# Patient Record
Sex: MALE
[De-identification: names, ages, dates, MRNs, and addresses within clinical notes are randomized per-mention and may not be internally consistent; named-entity substitution may affect disease eponyms.]

---

## 2018-11-20 ENCOUNTER — HOSPITAL ENCOUNTER (INPATIENT)
Dept: HOSPITAL 14 - H.ER | Age: 18
LOS: 7 days | Discharge: HOME | DRG: 885 | End: 2018-11-27
Attending: PSYCHIATRY & NEUROLOGY | Admitting: PSYCHIATRY & NEUROLOGY
Payer: COMMERCIAL

## 2018-11-20 VITALS — OXYGEN SATURATION: 99 %

## 2018-11-20 DIAGNOSIS — R45.851: ICD-10-CM

## 2018-11-20 DIAGNOSIS — F33.2: Primary | ICD-10-CM

## 2018-11-20 LAB
ALBUMIN SERPL-MCNC: 4.8 G/DL (ref 3.5–5)
ALBUMIN/GLOB SERPL: 1.2 {RATIO} (ref 1–2.1)
ALT SERPL-CCNC: 45 U/L (ref 21–72)
AST SERPL-CCNC: 38 U/L (ref 17–59)
BASOPHILS # BLD AUTO: 0 K/UL (ref 0–0.2)
BASOPHILS NFR BLD: 0.5 % (ref 0–2)
BILIRUB UR-MCNC: NEGATIVE MG/DL
BUN SERPL-MCNC: 14 MG/DL (ref 9–20)
CALCIUM SERPL-MCNC: 9.8 MG/DL (ref 8.4–10.2)
COLOR UR: YELLOW
EOSINOPHIL # BLD AUTO: 0.2 K/UL (ref 0–0.7)
EOSINOPHIL NFR BLD: 2.4 % (ref 0–4)
ERYTHROCYTE [DISTWIDTH] IN BLOOD BY AUTOMATED COUNT: 12.9 % (ref 11.5–14.5)
GFR NON-AFRICAN AMERICAN: > 60
GLUCOSE UR STRIP-MCNC: (no result) MG/DL
HGB BLD-MCNC: 14.6 G/DL (ref 12–18)
LEUKOCYTE ESTERASE UR-ACNC: (no result) LEU/UL
LYMPHOCYTES # BLD AUTO: 2.7 K/UL (ref 1–4.3)
LYMPHOCYTES NFR BLD AUTO: 26.4 % (ref 20–40)
MCH RBC QN AUTO: 28.7 PG (ref 27–31)
MCHC RBC AUTO-ENTMCNC: 33.6 G/DL (ref 33–37)
MCV RBC AUTO: 85.6 FL (ref 80–94)
MONOCYTES # BLD: 0.8 K/UL (ref 0–0.8)
MONOCYTES NFR BLD: 7.7 % (ref 0–10)
NEUTROPHILS # BLD: 6.4 K/UL (ref 1.8–7)
NEUTROPHILS NFR BLD AUTO: 63 % (ref 50–75)
NRBC BLD AUTO-RTO: 0 % (ref 0–0)
PH UR STRIP: 6 [PH] (ref 5–8)
PLATELET # BLD: 260 K/UL (ref 130–400)
PMV BLD AUTO: 8 FL (ref 7.2–11.7)
PROT UR STRIP-MCNC: NEGATIVE MG/DL
RBC # BLD AUTO: 5.07 MIL/UL (ref 4.4–5.9)
RBC # UR STRIP: NEGATIVE /UL
SP GR UR STRIP: 1.02 (ref 1–1.03)
SQUAMOUS EPITHIAL: < 1 /HPF (ref 0–5)
URINE CLARITY: (no result)
UROBILINOGEN UR-MCNC: (no result) MG/DL (ref 0.2–1)
WBC # BLD AUTO: 10.2 K/UL (ref 4.8–10.8)

## 2018-11-20 PROCEDURE — GZ58ZZZ INDIVIDUAL PSYCHOTHERAPY, COGNITIVE-BEHAVIORAL: ICD-10-PCS | Performed by: PSYCHIATRY & NEUROLOGY

## 2018-11-20 NOTE — ED PDOC
HPI: Psych/Substance Abuse


Time Seen by Provider: 11/20/18 16:36


Chief Complaint (Nursing): Psychiatric Evaluation


Chief Complaint (Provider): Psychiatric Evaluation


History Per: Patient


History/Exam Limitations: no limitations


Current Symptoms Are (Timing): Still Present


Additional Complaint(s): 





Tristan Rao is a 18 year old male with a past medical history of depression, 

who presents to the emergency department after being sent by his psychiatrist 

for possible inpatient treatment for depression given suicidal ideation. Patient

states he has been suffering from depression for several years and that it has 

worsened over the past month. He states that he takes Zoloft without any improv

ements. Patient has attempted to drown himself 1.5 weeks ago but he decided that

he "did not want to be a vegetable". Patient has stated to have found 

hydromorphone and was going to take it but ended up deciding against it. He 

currently admits to feeling suicidal but denies any homicidal ideation, auditory

or visual hallucination, or any physical complaints. Patient denies any drug or 

alcohol abuse. 





PMD: Dariana Oshea


Psychiatrist: Vinod Reveles 











Past Medical History


Reviewed: Historical Data, Nursing Documentation, Vital Signs


Vital Signs: 





                                Last Vital Signs











Temp  98.0 F   11/20/18 15:59


 


Pulse  82   11/20/18 15:59


 


Resp  18   11/20/18 15:59


 


BP  144/79   11/20/18 15:59


 


Pulse Ox  99   11/20/18 15:59














- Medical History


PMH: Depression





- Family History


Family History: States: Unknown Family Hx





- Allergies


Allergies/Adverse Reactions: 


                                    Allergies











Allergy/AdvReac Type Severity Reaction Status Date / Time


 


No Known Allergies Allergy   Verified 11/20/18 15:59














Review of Systems


ROS Statement: Except As Marked, All Systems Reviewed And Found Negative


Constitutional: Negative for: Fever, Chills, Sweats, Malaise, Weight loss


Cardiovascular: Negative for: Chest Pain, Palpitations


Respiratory: Negative for: Cough, Shortness of Breath


Psych: Positive for: Suicidal ideation.  Negative for: Other (homicidal 

ideation; auditory or visual hallucinations )





Physical Exam





- Reviewed


Nursing Documentation Reviewed: Yes


Vital Signs Reviewed: Yes





- Physical Exam


Cardiovascular/Chest: Positive for: Regular Rate, Rhythm.  Negative for: Murmur


Respiratory: Positive for: Normal Breath Sounds.  Negative for: Respiratory 

Distress


Neurologic/Psych: Positive for: Alert, Oriented





- Laboratory Results


Result Diagrams: 


                                 11/20/18 19:43





                                 11/20/18 19:43





- ECG


O2 Sat by Pulse Oximetry: 99 (RA)


Pulse Ox Interpretation: Normal





Medical Decision Making


Medical Decision Making: 





Initial Time: 17:18


Plan:


--Crisis evaluation


--CMP


--Urinalysis


--Chest X-ray 


--alcohol serum


--Urine Drug screen


--1:1 observation





Time: 17:33


Chest X-ray FINDINGS:


Examination limited by habitus. 





LUNGS:


No focal consolidation.  Innumerable scattered tiny probable calcified 

granulomas. 





Please note that chest x-ray has limited sensitivity for the detection of 

pulmonary masses.





PLEURA:


No significant pleural effusion identified. No definite pneumothorax .





CARDIOVASCULAR:


Heart size appears within normal limits.  No significant atherosclerotic 

calcification present. 





OSSEOUS STRUCTURES:


No acute osseous abnormality identified.





VISUALIZED UPPER ABDOMEN:


Unremarkable.





OTHER FINDINGS:


None.





IMPRESSION:


Innumerable scattered tiny probable calcified granulomas.





Time: 19:30


Chest x-ray shows likely calcified granuloma


Patient denies any history of TB, sarcoidosis, recent fevers, coughs, hemptosis 

or recent PPD or exposure to TB. 





Case d/w Hospitalist no need for further evaluation at this time given that 

patient is asymptomatic. Needs outpatient f/u. 





Pt is medically stable for psych admission. 





------------------------------------------------------------

-------------------------------------   


Scribe Attestation:


Documented by Nikolay Amado, acting as a scribe for Farideh Kent PA-C. 





Provider Scribe Attestation:


All medical record entries made by the Scribe were at my direction and 

personally dictated by me. I have reviewed the chart and agree that the record 

accurately reflects my personal performance of the history, physical exam, 

medical decision making, and the department course for this patient. I have also

personally directed, reviewed, and agree with the discharge instructions and 

disposition.





Disposition





- Clinical Impression


Clinical Impression: 


 Depression








- Patient ED Disposition


Is Patient to be Admitted: Yes


Discussed With : Jennifer Small


Doctor Will See Patient In The: Hospital


Counseled Patient/Family Regarding: Studies Performed, Diagnosis, Need For 

Followup





- Disposition


Disposition: Transfer of Care


Disposition Time: 22:45


Condition: STABLE





- POA


Present On Arrival: None

## 2018-11-20 NOTE — RAD
HISTORY:

 baseline 



COMPARISON:

No prior. 



TECHNIQUE:

Chest, one view.



FINDINGS:

Examination limited by habitus. 



LUNGS:

No focal consolidation.  Innumerable scattered tiny probable 

calcified granulomas. 



Please note that chest x-ray has limited sensitivity for the 

detection of pulmonary masses.



PLEURA:

No significant pleural effusion identified. No definite pneumothorax .



CARDIOVASCULAR:

Heart size appears within normal limits.  No significant 

atherosclerotic calcification present. 



OSSEOUS STRUCTURES:

No acute osseous abnormality identified.



VISUALIZED UPPER ABDOMEN:

Unremarkable.



OTHER FINDINGS:

None.



IMPRESSION:

Innumerable scattered tiny probable calcified granulomas.

## 2018-11-21 LAB
HDLC SERPL-MCNC: 34 MG/DL (ref 30–70)
LDLC SERPL-MCNC: 139 MG/DL (ref 0–129)
T4 SERPL-MCNC: 6.79 UG/DL (ref 5.5–11)

## 2018-11-21 NOTE — CARD
--------------- APPROVED REPORT --------------





Date of service: 11/20/2018



EKG Measurement

Heart [id number]

[id number]

[id number]

[id number]

[id number]



<Conclusion>

Normal sinus rhythm

Normal Electrocardiogram

## 2018-11-21 NOTE — PCM.PSYCH
Initial Psychiatric Evaluation





- Initial Psychiatric Evaluation


Type of Admission: Voluntary


Legal Status: Capacity


Chief Complaint (in patient's own words): 





I want to give up on my life


History of Present Illness and Precipitating Events: 





pt is 18ys old male with previous psychiatric diagnosis of depression referred 

to ER by private psychiatrist after expressing suicidal ideation with plan to 

drown himself.


pt has been in therapy since age 9 for depression due to bullying, has been 

seeing private psychiatrist for past four years, placed on zoloft 100 mg ,


pt has previous suicidal attempt two years ago trying to hang himself and stop

ped himself


reported for past month depression is worse as he has been unable to perform in 

school with possibility of loosing his scholarship and for recent death of his 

grand mother


reported poor motivation, low energy, lack of interest in any activity, 

continues to have passive suicidal thoughts without active plan on the unit, 


denied perceptual disturbances, denied substance use 





collateral information





Mother is tearful and believes pt requires inpatient admission.  Mother states 

that pt has several stressors of recent such as failing school which will result

into losing his scholarship.  Second stressor his grandmother recently passed 

away as well as his .  Mother reports a hx of suicide attempts 

in the family and states that pt's cousin committed suicide this year.





Current Medications: 





Active Medications











Generic Name Dose Route Start Last Admin





  Trade Name Freq  PRN Reason Stop Dose Admin


 


Acetaminophen  650 mg  11/20/18 22:14  





  Tylenol 325mg Tab  PO   





  Q4 PRN   





  pain 4-7   





     





     





     


 


Al Hydrox/Mg Hydrox/Simethicone  30 ml  11/20/18 22:14  





  Maalox Plus 30 Ml  PO   





  Q4 PRN   





  Dyspepsia   





     





     





     


 


Bupropion HCl  100 mg  11/22/18 09:00  





  Wellbutrin  PO   





  DAILY KARELY   





     





     





     





     


 


Diphenhydramine HCl  50 mg  11/20/18 22:14  





  Benadryl  IM   





  Q6 PRN   





  Extrapyramidal S/S Unable PO   





     





     





     


 


Diphenhydramine HCl  50 mg  11/20/18 22:14  





  Benadryl  PO   





  Q6 PRN   





  Extrapyramidal Symptoms   





     





     





     


 


Diphenhydramine HCl  50 mg  11/20/18 22:14  





  Benadryl  PO   





  HS PRN   





  Sleep   





     





     





     


 


Haloperidol  5 mg  11/20/18 22:14  





  Haldol  PO   





  Q4 PRN   





  Agitation   





     





     





     


 


Haloperidol Lactate  5 mg  11/20/18 22:14  





  Haldol  IM   





  Q4 PRN   





  Agitation, Unable to Take PO   





     





     





     


 


Lorazepam  1 mg  11/20/18 22:54  





  Ativan  IM   





  Q8 PRN   





  Anxiety/Agitation,Unable PO   





     





     





     


 


Lorazepam  1 mg  11/20/18 22:54  





  Ativan  PO   





  Q8 PRN   





  Anxiety/Agitation   





     





     





     


 


Magnesium Hydroxide  30 ml  11/20/18 22:14  





  Milk Of Magnesia  PO   





  HS PRN   





  Constipation   





     





     





     


 


Sertraline HCl  50 mg  11/22/18 11:05  





  Zoloft  PO   





  DAILY KARELY   





     





     





     





     


 


Trazodone HCl  50 mg  11/21/18 11:03  





  Desyrel  PO   





  HS PRN   





  Insomnia   





     





     





     














Past Psychiatric History





- Past Psychiatric History


Explanation of prior treatment: 





no previous hospitalizations 


History of ETOH/Drug Use: 





denied 


History of Family Illness: 





denied 


Pertinent Medical Hx (Current Medical&Sleep Prob, Allergies): 





                                    Allergies











Allergy/AdvReac Type Severity Reaction Status Date / Time


 


No Known Allergies Allergy   Verified 11/20/18 15:59














Mental Status Examination





- Personal Presentation


Personal Presentation: Looks stated age





- Affect


Affect: Constricted, Depressed





- Motor Activity


Motor Activity: Psychomotor Retardation





- Reliability in Providing Information


Reliability in Providing Information: Fair





- Speech


Speech: Relevant





- Mood


Mood: Depressed, Anxious





- Formal Thought Process


Formal Thought Process: Circumstantial





- Obsessions/Compulsions


Obsessions: No


Compulsions: No





- Cognitive Functions


Orientation: Person, Place, Situation


Sensorium: Alert


Attention/Concentration: Attentive


Judgement: Imparied, as evidence by: Poor judgement





- Risk


Risk: Suicidal, Diminished functioning





- Strength & Assets Inventory


Strength & Assets Inventory: Family support





- Limitations


Additional comments: 


educational problems 








DSM 5 DX





- DSM 5


DSM 5 Diagnosis: 





major depression recurrent severe





- Recommended/Plan of Treatment


Treatment Recommendations and Plan of Treatment: 





cross taper zoloft with wellbutrin


CBT motivational and group therapy


monitor p for psychopharmacological effects and side effect profile

## 2018-11-22 PROCEDURE — GZHZZZZ GROUP PSYCHOTHERAPY: ICD-10-PCS | Performed by: PSYCHIATRY & NEUROLOGY

## 2018-11-22 NOTE — CP.PCM.CON
History of Present Illness





- History of Present Illness


History of Present Illness: 





19 yo male with history of depression admitted to psyche unit because of 

suicidal ideation.





























Review of Systems





- Review of Systems


All systems: reviewed and no additional remarkable complaints except (aside from

those mentioned above, 12 point system review were negative by me)





Past Patient History





- Tetanus Immunizations


Tetanus Immunization: Unknown





- Past Social History


Smoking Status: Never Smoked


Chewing Tobacco Use: No


Cigar Use: No


Alcohol: Occasional


Home Situation {Lives}: With Family





- CARDIAC


Hx Cardiac Disorders: No





- PULMONARY


Hx Respiratory Disorders: No





- NEUROLOGICAL


Hx Neurological Disorder: No





- HEENT


Hx HEENT Problems: No





- RENAL


Hx Chronic Kidney Disease: No





- ENDOCRINE/METABOLIC


Hx Endocrine Disorders: No





- HEMATOLOGICAL/ONCOLOGICAL


Hx Blood Disorders: No





- INTEGUMENTARY


Hx Dermatological Problems: No





- MUSCULOSKELETAL/RHEUMATOLOGICAL


Hx Musculoskeletal Disorders: No





- GASTROINTESTINAL


Hx Gastrointestinal Disorders: No





- GENITOURINARY/GYNECOLOGICAL


Hx Genitourinary Disorders: No





- PSYCHIATRIC


Hx Depression: Yes





- SURGICAL HISTORY


Hx Surgeries: Yes


Other/Comment: foot surgery 2 years ago





- ANESTHESIA


Hx Anesthesia: No


Hx Anesthesia Reactions: No





Meds


Allergies/Adverse Reactions: 


                                    Allergies











Allergy/AdvReac Type Severity Reaction Status Date / Time


 


No Known Allergies Allergy   Verified 11/20/18 15:59














- Medications


Medications: 


                               Current Medications





Acetaminophen (Tylenol 325mg Tab)  650 mg PO Q4 PRN


   PRN Reason: pain 4-7


Al Hydrox/Mg Hydrox/Simethicone (Maalox Plus 30 Ml)  30 ml PO Q4 PRN


   PRN Reason: Dyspepsia


Bupropion HCl (Wellbutrin Sr 150 Mg)  150 mg PO DAILY KARELY


Diphenhydramine HCl (Benadryl)  50 mg IM Q6 PRN


   PRN Reason: Extrapyramidal S/S Unable PO


Diphenhydramine HCl (Benadryl)  50 mg PO Q6 PRN


   PRN Reason: Extrapyramidal Symptoms


Diphenhydramine HCl (Benadryl)  50 mg PO HS PRN


   PRN Reason: Sleep


Haloperidol (Haldol)  5 mg PO Q4 PRN


   PRN Reason: Agitation


Haloperidol Lactate (Haldol)  5 mg IM Q4 PRN


   PRN Reason: Agitation, Unable to Take PO


Lorazepam (Ativan)  1 mg IM Q8 PRN


   PRN Reason: Anxiety/Agitation,Unable PO


Lorazepam (Ativan)  1 mg PO Q8 PRN


   PRN Reason: Anxiety/Agitation


Magnesium Hydroxide (Milk Of Magnesia)  30 ml PO HS PRN


   PRN Reason: Constipation


Sertraline HCl (Zoloft)  25 mg PO DAILY KARELY


Trazodone HCl (Desyrel)  50 mg PO HS PRN


   PRN Reason: Insomnia











Physical Exam





- Constitutional


Appears: No Acute Distress





- Head Exam


Head Exam: ATRAUMATIC





- Eye Exam


Eye Exam: absent: Scleral icterus





- ENT Exam


ENT Exam: Mucous Membranes Moist





- Neck Exam


Neck exam: Negative for: Meningismus





- Respiratory Exam


Respiratory Exam: absent: Rales, Rhonchi, Wheezes, Respiratory Distress





- Cardiovascular Exam


Cardiovascular Exam: REGULAR RHYTHM, +S1, +S2





- GI/Abdominal Exam


GI & Abdominal Exam: Soft.  absent: Tenderness





- Rectal Exam


Rectal Exam: Deferred





- Extremities Exam


Extremities exam: Negative for: calf tenderness, pedal edema





- Back Exam


Back exam: NORMAL INSPECTION





- Neurological Exam


Neurological exam: Alert, Oriented x3





- Psychiatric Exam


Psychiatric exam: Normal Affect





- Skin


Skin Exam: Dry, Intact





Results





- Vital Signs


Recent Vital Signs: 


                                Last Vital Signs











Temp  97.5 F L  11/22/18 09:40


 


Pulse  79   11/22/18 09:40


 


Resp  16   11/22/18 09:40


 


BP  128/71   11/22/18 09:40


 


Pulse Ox  99   11/20/18 23:41














- Labs


Result Diagrams: 


                                 11/20/18 19:43





                                 11/20/18 19:43





Assessment & Plan


(1) Suicidal ideation


Status: Acute   


Comment: psyche is managing   





(2) Depression


Status: Acute

## 2018-11-22 NOTE — PCM.PYCHPN
Psychiatric Progress Note





- Psychiatric Progress Note


Patient seen today, length of contact: pt evaluated discussed with team chart 

reviewed


Patient Chief Complaint: 





I still feel down


Problems Identified/Issues Discussed: 





pt evaluated, reported depressed mood, affect dysphoric, reported low energy 

poor motivation, poor concentration, discussed gradual increase in dose of 

wellbutrin, denied command hallucinations denied S/H I


Medical Problems: 





no previous hospitalizations 


DSM 5 Symptoms Update: 





major depression


Medication Change: Yes (increase wellbutrin)


Medical Record Reviewed: Yes





Mental Status Examination





- Cognitive Function


Orientation: Person, Place, Situation


Attention: WNL


Concentration: Poor


Association: WNL


Fund of Knowledge: WNL


Decription of patient's judgement and insights: 





partial insight poor judgment 





- Mood


Mood: Depressed, Anxious





- Affect


Affect: Constricted, Depressed





- Speech


Speech: Soft





- Formal Thought Process


Formal Thought Process: Circumstantial





- Suicidal Ideation


Suicidal Ideation: No





- Homicidal Ideation


Homicidal Ideation: No





Goal/Treatment Plan





- Goal/Treatment Plan


Need for Continued Stay: Severe depression anxiety, Discharge may exacerbated 

symptoms


Progress Toward Problem(s) and Goals/Treatment Plan: 





cross taper zoloft with wellbutrin


CBT motivational and group therapy


monitor p for psychopharmacological effects and side effect profile

## 2018-11-23 NOTE — PCM.PYCHPN
Psychiatric Progress Note





- Psychiatric Progress Note


Patient seen today, length of contact: pt evaluated discussed with team chart 

reviewed


Patient Chief Complaint: 





I feel better with increasing the wellbutrin


Problems Identified/Issues Discussed: 





pt evaluated, reported better mood with increasing the wellbutrin, no reported 

side effects, pt reported improved level of energy , more visible in day room, 

participating in groups, denied any current thoughts of self harm,  denied 

command hallucinations denied S/H I


Medical Problems: 





no previous hospitalizations 


DSM 5 Symptoms Update: 





major depression recurrent 


Medication Change: Yes (discontinue zoloft)


Medical Record Reviewed: Yes





Mental Status Examination





- Cognitive Function


Orientation: Person, Place, Situation


Attention: WNL


Concentration: WNL


Association: WNL


Fund of Knowledge: WNL


Decription of patient's judgement and insights: 





partial insight poor judgment 





- Mood


Mood: Depressed, Anxious





- Affect


Affect: Constricted, Depressed





- Speech


Speech: Soft





- Formal Thought Process


Formal Thought Process: Circumstantial





- Suicidal Ideation


Suicidal Ideation: No





- Homicidal Ideation


Homicidal Ideation: No





Goal/Treatment Plan





- Goal/Treatment Plan


Need for Continued Stay: Severe depression anxiety, Discharge may exacerbated 

symptoms


Progress Toward Problem(s) and Goals/Treatment Plan: 





discontinue zoloft


wellbutrin sr 150mg daily 


CBT motivational and group therapy


monitor p for psychopharmacological effects and side effect profile

## 2018-11-24 NOTE — PCM.PYCHPN
Psychiatric Progress Note





- Psychiatric Progress Note


Patient seen today, length of contact: Pt evaluated, case discussed w/ team, 

chart reviewed


Patient Chief Complaint: 


Depression


Problems Identified/Issues Discussed: 


Patient continues to report feeling depressed. He denies acute AH/VH/SI/HI.  He 

denies current ideation to harm himself.  He reports that the Wellbutrin 

initially make him feel jittery, but reports that he no longer feels that way.  

No current adverse efffects to medications reported.  


Medication Change: No


Medical Record Reviewed: Yes


Consults ordered or reviewed: 


Medicine consult





Mental Status Examination





- Cognitive Function


Orientation: Person, Place, Situation, Time


Memory: Intact


Attention: WNL


Concentration: WNL


Association: WNL


Fund of Knowledge: WNL


Decription of patient's judgement and insights: 


Improving I/J





- Mood


Mood: Depressed





- Affect


Affect: Constricted





- Speech


Speech: Appropriate





- Formal Thought Process


Formal Thought Process: No Impairment


Psychotic Thoughts and Behaviors: 


No AH/VH/paranoia/delusions





- Suicidal Ideation


Suicidal Ideation: No





- Homicidal Ideation


Homicidal Ideation: No





Goal/Treatment Plan





- Goal/Treatment Plan


Need for Continued Stay: Severe depression anxiety, Discharge may exacerbated 

symptoms


Progress Toward Problem(s) and Goals/Treatment Plan: 


Major Depressive Disorder





-Continue current medications


-Individual and group therapy


-Psychoeducation


-Disposition planning

## 2018-11-25 NOTE — PCM.PYCHPN
Psychiatric Progress Note





- Psychiatric Progress Note


Patient seen today, length of contact: Pt evaluated, case discussed w/ team, 

chart reviewed


Patient Chief Complaint: 


Depression


Problems Identified/Issues Discussed: 


Patient reports that his mood is improving.  He feels less depressed.   He 

denies acute AH/VH/SI/HI.  He denies current ideation to harm himself.    No 

current adverse effects to medications reported.  


Medication Change: No


Medical Record Reviewed: Yes


Consults ordered or reviewed: 


Medicine consult





Mental Status Examination





- Cognitive Function


Orientation: Person, Place, Situation, Time


Memory: Intact


Attention: WNL


Concentration: WNL


Association: WNL


Fund of Knowledge: [facility]


Decription of patient's judgement and insights: 


Improving I/J





- Mood


Mood: Depressed





- Affect


Affect: Constricted





- Speech


Speech: Appropriate





- Formal Thought Process


Formal Thought Process: No Impairment


Psychotic Thoughts and Behaviors: 


No AH/VH/paranoia/delusions





- Suicidal Ideation


Suicidal Ideation: No





- Homicidal Ideation


Homicidal Ideation: No





Goal/Treatment Plan





- Goal/Treatment Plan


Need for Continued Stay: Severe depression anxiety, Discharge may exacerbated 

symptoms


Progress Toward Problem(s) and Goals/Treatment Plan: 


Major Depressive Disorder





-Continue current medications


-Individual and group therapy


-Psychoeducation


-Disposition planning

## 2018-11-26 NOTE — PCM.PYCHPN
Psychiatric Progress Note





- Psychiatric Progress Note


Patient seen today, length of contact: Pt evaluated, case discussed w/ team, 

chart reviewed


Patient Chief Complaint: 





was feeling depressed little less is somewhat isolative per staff a times. rx 

adherent. denies side effects medications


Medical Problems: 





per chart


Diagnostic Results: 





per psychiatry


per medicine


per nursing


per 


per recreational therapy


Medication Change: No


Medical Record Reviewed: Yes


Consults ordered or reviewed: 





pt seen by hospitalist





Mental Status Examination





- Cognitive Function


Orientation: Person, Place, Situation, Time


Memory: Intact


Attention: WNL


Concentration: WNL


Association: WNL


Fund of Knowledge: WNL


Decription of patient's judgement and insights: 





impaired





- Mood


Mood: Depressed





- Affect


Affect: Constricted





- Speech


Speech: Appropriate





- Formal Thought Process


Formal Thought Process: No Impairment





- Suicidal Ideation


Suicidal Ideation: No





- Homicidal Ideation


Homicidal Ideation: No





Goal/Treatment Plan





- Goal/Treatment Plan


Need for Continued Stay: Severe depression anxiety, Discharge may exacerbated 

symptoms


Progress Toward Problem(s) and Goals/Treatment Plan: 





inpt milieu


adjust meds per status


clinical observation and vital signs per protocol and per clinical status


discharge planning in progress


Estimated Date of D/C: 11/27/18





- Smoking Cessation


Smoking Cessation Initiated: No


Reason for not providing: deferred

## 2018-11-27 VITALS
TEMPERATURE: 97 F | RESPIRATION RATE: 17 BRPM | DIASTOLIC BLOOD PRESSURE: 60 MMHG | HEART RATE: 75 BPM | SYSTOLIC BLOOD PRESSURE: 114 MMHG

## 2018-11-27 NOTE — PCM.[facility]
Mental Status Examination





- Mental Status Examination


Orientation: Person, Place, Situation


Memory: Intact


Mood: Neutral


Affect: Broad


Speech: Appropriate


Attention: WNL


Concentration: WNL


Association: WNL


Fund of Knowledge: WNL


Formal Thought Process: No Impairment


Description of patient's judgement and insight: 





partial insight and fair  judgment 


Psychotic Thoughts and Behaviors: 





pt denied psychotic symptoms, non elicited 


Suicidal Ideation: No


Current Homicidal Ideation?: No





Discharge Summary





- Discharge Note


Reason for Hospitalization: 


pt is 18ys old male with previous psychiatric diagnosis of depression referred 

to ER by private psychiatrist after expressing suicidal ideation with plan to 

drown himself.


pt has been in therapy since age 9 for depression due to bullying, has been 

seeing private psychiatrist for past four years, placed on zoloft 100 mg ,


pt has previous suicidal attempt two years ago trying to hang himself and 

stopped himself


reported for past month depression is worse as he has been unable to perform in 

school with possibility of loosing his scholarship and for recent death of his 

grand mother


reported poor motivation, low energy, lack of interest in any activity, 

continues to have passive suicidal thoughts without active plan on the unit, 


denied perceptual disturbances, denied substance use 





Consultations:: List each consultation separately and include:  1. Reason for 

request.  2. Findings.  3. Follow-up


Summary of Hospital Course include:: 1. Description of specific treatment plan 

utilized for patients during their course of treatmen.  2. Summarize the time-

course for resolution of acute symptoms and/or regressed behaviors.  3. Describe

issues identified and worked on during hospitalization.  4. Describe medication 

utilized.  5. Describe medical problems identified and treated.  6. Reassessment

of suicide risk


Summary of Hospital Course: 





pt on admission presented with depressed mood and affect decreased energy and 

low motivation,


pt was started on wellbutrin , it was gradually increased to 150 mg daily, 

zoloft was gradually discontinued 


CBT provided, pt attended groups was compliant with treatment, no reported side 

effects


on discharge pt mental status was stable, denied any current suicidal or 

homicidal ideation, denied perceptual disturbances


treatment plan discussed with mother upon pt consent


follow up arranged by  at [facility] program








- Diagnosis


(1) Depression


Current Visit: Yes   Status: Acute   





- Final Diagnosis (DSM 5)


Condition upon Discharge: STABLE


DSM 5: 





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 


Disposition: HOME/ ROUTINE


Follow-up Treatment Plan: 





discontinue zoloft


wellbutrin sr 150mg daily 


CBT motivational and group therapy


monitor p for psychopharmacological effects and side effect profile 


Prescriptions/Medication Reconciliation: 


RX: buPROPion SR [Wellbutrin  MG] 150 mg PO DAILY 30 Days #30 tab


RX: traZODone [Desyrel] 50 mg PO HS PRN 30 Days #30 tab


 PRN Reason: Insomnia





- Antipsychotic Medications


Pt discharged on 2 or more routine antipsychotic medications: No
